# Patient Record
Sex: MALE | Race: WHITE | HISPANIC OR LATINO | Employment: FULL TIME | URBAN - METROPOLITAN AREA
[De-identification: names, ages, dates, MRNs, and addresses within clinical notes are randomized per-mention and may not be internally consistent; named-entity substitution may affect disease eponyms.]

---

## 2018-05-17 ENCOUNTER — OFFICE VISIT (OUTPATIENT)
Dept: URGENT CARE | Facility: CLINIC | Age: 53
End: 2018-05-17
Payer: COMMERCIAL

## 2018-05-17 VITALS
HEART RATE: 86 BPM | HEIGHT: 66 IN | WEIGHT: 195 LBS | BODY MASS INDEX: 31.34 KG/M2 | TEMPERATURE: 98.8 F | OXYGEN SATURATION: 92 % | SYSTOLIC BLOOD PRESSURE: 124 MMHG | DIASTOLIC BLOOD PRESSURE: 78 MMHG

## 2018-05-17 DIAGNOSIS — J06.9 URI WITH COUGH AND CONGESTION: ICD-10-CM

## 2018-05-17 DIAGNOSIS — E66.9 OBESITY (BMI 30-39.9): ICD-10-CM

## 2018-05-17 DIAGNOSIS — J40 BRONCHITIS: Primary | ICD-10-CM

## 2018-05-17 DIAGNOSIS — J01.10 ACUTE NON-RECURRENT FRONTAL SINUSITIS: ICD-10-CM

## 2018-05-17 PROCEDURE — 99214 OFFICE O/P EST MOD 30 MIN: CPT | Performed by: PHYSICIAN ASSISTANT

## 2018-05-17 RX ORDER — METHYLPREDNISOLONE 4 MG/1
TABLET ORAL
Qty: 21 TAB | Refills: 0 | Status: SHIPPED | OUTPATIENT
Start: 2018-05-17 | End: 2023-02-27

## 2018-05-17 RX ORDER — PROMETHAZINE HYDROCHLORIDE AND CODEINE PHOSPHATE 6.25; 1 MG/5ML; MG/5ML
5 SYRUP ORAL 4 TIMES DAILY PRN
Qty: 240 ML | Refills: 0 | Status: SHIPPED | OUTPATIENT
Start: 2018-05-17 | End: 2018-05-31

## 2018-05-17 RX ORDER — DOXYCYCLINE HYCLATE 100 MG
100 TABLET ORAL 2 TIMES DAILY
Qty: 14 TAB | Refills: 0 | Status: SHIPPED | OUTPATIENT
Start: 2018-05-17 | End: 2018-05-24

## 2018-05-17 NOTE — PATIENT INSTRUCTIONS
Bronquitis aguda  (Acute Bronchitis)  Se denomina bronquitis cuando las vías respiratorias que van desde la tráquea hasta los pulmones se irritan, se congestionan y duelen (se inflaman). La bronquitis generalmente produce flema espesa (mucosidad). [place] provoca tos. La tos es el síntoma más frecuente de la bronquitis.  Cuando la bronquitis es aguda, generalmente comienza de manera súbita y desaparece luego de algún tiempo (generalmente en 2 semanas). El hábito de fumar, las alergias y el asma pueden empeorar la bronquitis. Los episodios repetidos de bronquitis pueden causar más problemas pulmonares.  CUIDADOS EN EL HOGAR  · Reposo.  · Torri abundante cantidad de líquidos para mantener el pis orina) migue o amarillo pálido (excepto que debe limitar la ingesta de líquidos por indicación del médico).  · [place] sólo medicamentos de venta gibson o recetados, según las indicaciones del médico.  · Evite fumar o aspirar el humo de otros fumadores. [place] puede empeorar la bronquitis. Si es fumador, considere el uso de chicles o parches en la piel de nicotina. Si carolina de fumar, maria isabel pulmones se curarán más rápido.  · Reduzca la probabilidad de enfermarse nuevamente de bronquitis de hardik modo:  ¨ Lávese las yana con frecuencia.  ¨ Evite las personas que tengan síntomas de resfrío.  ¨ Trate de no llevarse las yana a la boca, la nariz o los ojos.  · Concurra a las consultas de control con el médico, según las indicaciones.  SOLICITE AYUDA SI:  Los síntomas no mejoran después de 1 semana de tratamiento. Los síntomas son:  · Tos.  · Fiebre.  · Eliminar moco espeso al toser.  · Judi en el cuerpo.  · Congestión en el pecho.  · Escalofríos.  · Falta de aire.  · Dolor de garganta.  SOLICITE AYUDA DE INMEDIATO SI:   · Le sube la fiebre.  · Tiene escalofríos.  · Comienza a sentir que le falta el aire de manera preocupante.  · Tiene expectoración con lyric (esputo).  · Devuelve (vomita) con frecuencia.  · Sanchez organismo pierde mucho líquido  (deshidratación).  · Sufre un dolor intenso de judi.  · Se desmaya.  ASEGÚRESE DE QUE:   · Comprende estas instrucciones.  · Controlará bagley afección.  · Recibirá ayuda de inmediato si no mejora o si empeora.  Esta información no tiene lena fin reemplazar el consejo del médico. Asegúrese de hacerle al médico cualquier pregunta que tenga.  Document Released: 08/20/2014  Elsevier Interactive Patient Education © 2017 [employer] Inc[employer]

## 2018-05-17 NOTE — PROGRESS NOTES
Subjective:      Pt is a 52 y.o. male who presents with URI (x3 days, coughing, wheezing, tightness in chest, fever )            HPI  PT presents to [facility] clinic today complaining of sore throat, fevers, chills, watery eyes, pressure in ears, cough, fatigue, runny nose, wheezing and SOB. PT denies CP, NVD, abdominal pain, joint pain. PT states these symptoms began around 3 days. PT states the pain is a 7/10 with coughing, aching in nature and worse at night.  Pt has not taken any RX medications for this condition. The pt's medication list, problem list, and allergies have been evaluated and reviewed during today's visit.    PMH:  Past Medical History:   Diagnosis Date   • Tubular adenoma of colon 9/2016       PSH:  Negative per pt.      Fam Hx:    family history includes Diabetes in his paternal uncle; Hypertension in his father.  Family Status   Relation Status   • Paternal Uncle    • Father        Soc HX:  Social History     Social History   • Marital status:      Spouse name: N/A   • Number of children: N/A   • Years of education: N/A     Occupational History   • Not on file.     Social History Main Topics   • Smoking status: Never Smoker   • Smokeless tobacco: Never Used   • Alcohol use 0.6 oz/week     1 Standard drinks or equivalent per week      Comment: occassionally   • Drug use: No   • Sexual activity: Yes     Other Topics Concern   • Not on file     Social History Narrative   • No narrative on file         Medications:    Current Outpatient Prescriptions:   •  doxycycline (VIBRAMYCIN) 100 MG Tab, Take 1 Tab by mouth 2 times a day for 7 days., Disp: 14 Tab, Rfl: 0  •  promethazine-codeine (PHENERGAN-CODEINE) 6.25-10 MG/5ML Syrup, Take 5 mL by mouth 4 times a day as needed for up to 14 days., Disp: 240 mL, Rfl: 0  •  MethylPREDNISolone (MEDROL DOSEPAK) 4 MG Tablet Therapy Pack, Use as directed, Disp: 21 Tab, Rfl: 0      Allergies:  Patient has no known allergies.    ROS  Review of Systems  "  Constitutional: Positive for malaise/fatigue. Negative for fever and diaphoresis.   HENT: Positive for congestion, ear pain and sore throat. Negative for ear discharge, hearing loss, nosebleeds and tinnitus.    Eyes: Negative for blurred vision, double vision and photophobia.   Respiratory: Positive for cough, sputum production, shortness of breath and wheezing. Negative for hemoptysis.    Cardiovascular: Negative for chest pain and palpitations.   Gastrointestinal: Negative for nausea, vomiting, abdominal pain, diarrhea and constipation.   Genitourinary: Negative for dysuria and flank pain.   Musculoskeletal: Negative for joint pain and myalgias.   Skin: Negative for itching and rash.   Neurological:  Negative for dizziness, tingling and weakness.   Endo/Heme/Allergies: Does not bruise/bleed easily.   Psychiatric/Behavioral: Negative for depression. The patient is not nervous/anxious.             Objective:     /78   Pulse 86   Temp 37.1 °C (98.8 °F)   Ht 1.676 m (5' 6\")   Wt 88.5 kg (195 lb)   SpO2 92%   BMI 31.47 kg/m²      Physical Exam      Physical Exam   Constitutional: PT is oriented to person, place, and time. PT appears well-developed and well-nourished. No distress.   HENT:   Head: Normocephalic and atraumatic.   Right Ear: Hearing, tympanic membrane, external ear and ear canal normal.   Left Ear: Hearing, tympanic membrane, external ear and ear canal normal.   Nose: Mucosal edema, rhinorrhea and sinus tenderness present. Right sinus exhibits frontal sinus tenderness. Left sinus exhibits frontal sinus tenderness.   Mouth/Throat: Uvula is midline. Mucous membranes are pale. Posterior oropharyngeal edema and posterior oropharyngeal erythema present. No oropharyngeal exudate.   Eyes: Conjunctivae normal and EOM are normal. Pupils are equal, round, and reactive to light. Right eye exhibits no discharge. Left eye exhibits no discharge.   Neck: Normal range of motion. Neck supple. No thyromegaly " present.   Cardiovascular: Normal rate, regular rhythm, normal heart sounds and intact distal pulses.  Exam reveals no gallop and no friction rub.    No murmur heard.  Pulmonary/Chest: Effort normal. No respiratory distress. PT has wheezes. PT has no rales. PT exhibits tenderness.   Abdominal: Soft. Bowel sounds are normal. PT exhibits no distension and no mass. There is no tenderness. There is no rebound and no guarding.   Musculoskeletal: Normal range of motion. PT exhibits no edema and no tenderness.   Lymphadenopathy:     PT has no cervical adenopathy.   Neurological: Pt is alert and oriented to person, place, and time. Pt has normal reflexes. No cranial nerve deficit.   Skin: Skin is warm and dry. No rash noted. No erythema.   Psychiatric: PT has a normal mood and affect. Pt behavior is normal. Judgment and thought content normal.          Assessment/Plan:     1. Bronchitis    - doxycycline (VIBRAMYCIN) 100 MG Tab; Take 1 Tab by mouth 2 times a day for 7 days.  Dispense: 14 Tab; Refill: 0  - MethylPREDNISolone (MEDROL DOSEPAK) 4 MG Tablet Therapy Pack; Use as directed  Dispense: 21 Tab; Refill: 0    2. Acute non-recurrent frontal sinusitis    - doxycycline (VIBRAMYCIN) 100 MG Tab; Take 1 Tab by mouth 2 times a day for 7 days.  Dispense: 14 Tab; Refill: 0  - MethylPREDNISolone (MEDROL DOSEPAK) 4 MG Tablet Therapy Pack; Use as directed  Dispense: 21 Tab; Refill: 0    3. URI with cough and congestion    - promethazine-codeine (PHENERGAN-CODEINE) 6.25-10 MG/5ML Syrup; Take 5 mL by mouth 4 times a day as needed for up to 14 days.  Dispense: 240 mL; Refill: 0  - MethylPREDNISolone (MEDROL DOSEPAK) 4 MG Tablet Therapy Pack; Use as directed  Dispense: 21 Tab; Refill: 0    4. Obesity (BMI 30-39.9)    - Patient identified as having weight management issue.  Appropriate orders and counseling given.    Rest, fluids encouraged.  OTC decongestant for congestion/cough  Note given for work.  AVS with medical info given.  Pt  was in full understanding and agreement with the plan.  Follow-up as needed if symptoms worsen or fail to improve.

## 2018-05-17 NOTE — LETTER
May 17, 2018       Patient: Raymond Ballesteros   YOB: 1965   Date of Visit: 5/17/2018         To Whom It May Concern:    It is my medical opinion that Raymond Ballesteros may be excused from work for the dates of 5/17/18-5/18/18.      If you have any questions or concerns, please don't hesitate to call [phone]          Sincerely,          Farooq Hobbs P.A.-C.  Electronically Signed

## 2018-05-24 ENCOUNTER — OFFICE VISIT (OUTPATIENT)
Dept: MEDICAL GROUP | Facility: CLINIC | Age: 53
End: 2018-05-24
Payer: COMMERCIAL

## 2018-05-24 VITALS
DIASTOLIC BLOOD PRESSURE: 74 MMHG | OXYGEN SATURATION: 95 % | WEIGHT: 198 LBS | TEMPERATURE: 97.9 F | HEIGHT: 66 IN | HEART RATE: 79 BPM | RESPIRATION RATE: 13 BRPM | SYSTOLIC BLOOD PRESSURE: 110 MMHG | BODY MASS INDEX: 31.82 KG/M2

## 2018-05-24 DIAGNOSIS — M25.572 CHRONIC PAIN OF LEFT ANKLE: ICD-10-CM

## 2018-05-24 DIAGNOSIS — R05.3 PERSISTENT COUGH FOR 3 WEEKS OR LONGER: ICD-10-CM

## 2018-05-24 DIAGNOSIS — G89.29 CHRONIC PAIN OF LEFT ANKLE: ICD-10-CM

## 2018-05-24 PROCEDURE — 99213 OFFICE O/P EST LOW 20 MIN: CPT | Performed by: FAMILY MEDICINE

## 2018-05-24 RX ORDER — ALBUTEROL SULFATE 90 UG/1
2 AEROSOL, METERED RESPIRATORY (INHALATION) EVERY 6 HOURS PRN
Qty: 8.5 G | Refills: 3 | Status: SHIPPED | OUTPATIENT
Start: 2018-05-24 | End: 2023-02-28

## 2018-05-24 ASSESSMENT — PATIENT HEALTH QUESTIONNAIRE - PHQ9: CLINICAL INTERPRETATION OF PHQ2 SCORE: 0

## 2018-05-24 NOTE — PROGRESS NOTES
78 Chief Complaint   Patient presents with   • Ankle Pain   • Cough       Subjective:     HPI:   Raymond Ballesteros presents today with the followin. Chronic pain of left ankle  6 months of left ankle pain.  The pain is intermittent.  No pain today.  It swells at times, rarely.  No change in skin.  No injury identified that began this.  Sometimes it hurts to walk on the ankle.  He would like an x-ray to further determine what is going on.  X-ray ordered discussed and placed.    2. Persistent cough for 3 weeks or longer  He is still getting some phlegm with his cough.  This is better and he feels the cough medication has been somewhat helpful but what concerns him is occasional wheezing at night.  Discussed an inhaler.  He has had this in the past and felt it was very helpful.  Prescription is sent.    His wife is a poorly controlled diabetic who has been off her medication for almost 2 years.  When they have relations he will get itching and sometimes a small rash.  She is also getting itching and an occasional rash.  She denies discharge and he denies discharge as well.  This is most consistent with yeast vulvitis or vaginitis most likely secondary to her diabetes issues.  I am addressing this with her and expect that this will improve for him as well.  His last lab test did not show diabetes.    Patient Active Problem List    Diagnosis Date Noted   • Obesity (BMI 30-39.9) 2018   • Tubular adenoma of colon 2016       Current medicines (including changes today)  Current Outpatient Prescriptions   Medication Sig Dispense Refill   • albuterol (PROAIR HFA) 108 (90 Base) MCG/ACT Aero Soln inhalation aerosol Inhale 2 Puffs by mouth every 6 hours as needed for Shortness of Breath. 8.5 g 3   • doxycycline (VIBRAMYCIN) 100 MG Tab Take 1 Tab by mouth 2 times a day for 7 days. 14 Tab 0   • promethazine-codeine (PHENERGAN-CODEINE) 6.25-10 MG/5ML Syrup Take 5 mL by mouth 4 times a day as needed for up to 14  "days. 240 mL 0   • MethylPREDNISolone (MEDROL DOSEPAK) 4 MG Tablet Therapy Pack Use as directed 21 Tab 0     No current facility-administered medications for this visit.        No Known Allergies    ROS: As per HPI       Objective:     Blood pressure 110/74, pulse 79, temperature 36.6 °C (97.9 °F), resp. rate 13, height 1.676 m (5' 6\"), weight 89.8 kg (198 lb), SpO2 95 %. Body mass index is 31.96 kg/m².    Physical Exam:  Constitutional: Well-developed and well-nourished. Not diaphoretic. No distress. Lucid and fluent.  Wet cough.  Skin: Skin is warm and dry. No rash noted.  Head: Atraumatic without lesions.  Eyes: Conjunctivae and extraocular motions are normal. Pupils are equal, round, and reactive to light. No scleral icterus.   Mouth/Throat: Tongue normal. Oropharynx is clear and moist. Posterior pharynx without erythema or exudates.  Neck: Supple, trachea midline. No thyromegaly present. No cervical or supraclavicular lymphadenopathy. No JVD or carotid bruits appreciated  Cardiovascular: Regular rate and rhythm.  Normal S1, S2 without murmur appreciated.  Chest: Effort normal. Clear to auscultation throughout. No adventitious sounds.  There are a few upper airway sounds.  No wheezing appreciated at this time.  Abdomen: Soft, non tender, and without distention. Active bowel sounds in all four quadrants. No rebound, guarding, masses or hepatosplenomegaly.  Extremities: No cyanosis, clubbing, erythema, nor edema.  Left ankle today does not have any significant effusion.  There is no tenderness at the joint line the patient states that is where tenderness occurs when it is present.  No erythema or heat is appreciated.  Flexion of the ankle is somewhat limited.  Extension is good.  Neurological: Alert and oriented x 3.  Gait is quite normal today.  Psychiatric:  Behavior, mood, and affect are appropriate.       Assessment and Plan:     52 y.o. male with the following issues:    1. Chronic pain of left ankle  " DX-ANKLE 3+ VIEWS RIGHT   2. Persistent cough for 3 weeks or longer  albuterol (PROAIR HFA) 108 (90 Base) MCG/ACT Aero Soln inhalation aerosol         Followup: Return in about 4 months (around 9/24/2018), or if symptoms worsen or fail to improve.

## 2018-06-21 ENCOUNTER — HOSPITAL ENCOUNTER (OUTPATIENT)
Dept: RADIOLOGY | Facility: MEDICAL CENTER | Age: 53
End: 2018-06-21
Attending: FAMILY MEDICINE
Payer: COMMERCIAL

## 2018-06-21 DIAGNOSIS — G89.29 CHRONIC PAIN OF LEFT ANKLE: ICD-10-CM

## 2018-06-21 DIAGNOSIS — M25.572 CHRONIC PAIN OF LEFT ANKLE: ICD-10-CM

## 2018-06-21 PROCEDURE — 73610 X-RAY EXAM OF ANKLE: CPT | Mod: RT

## 2018-11-12 ENCOUNTER — NON-PROVIDER VISIT (OUTPATIENT)
Dept: OCCUPATIONAL MEDICINE | Facility: CLINIC | Age: 53
End: 2018-11-12

## 2018-11-12 DIAGNOSIS — Z02.1 PRE-EMPLOYMENT DRUG SCREENING: ICD-10-CM

## 2018-11-12 PROCEDURE — 8907 PR URINE COLLECT ONLY: Performed by: PREVENTIVE MEDICINE

## 2018-11-20 ENCOUNTER — HOSPITAL ENCOUNTER (EMERGENCY)
Dept: HOSPITAL 8 - ED | Age: 53
Discharge: HOME | End: 2018-11-20
Payer: SELF-PAY

## 2018-11-20 VITALS — BODY MASS INDEX: 31.85 KG/M2 | WEIGHT: 198.2 LBS | HEIGHT: 66 IN

## 2018-11-20 VITALS — DIASTOLIC BLOOD PRESSURE: 83 MMHG | SYSTOLIC BLOOD PRESSURE: 125 MMHG

## 2018-11-20 DIAGNOSIS — R11.2: ICD-10-CM

## 2018-11-20 DIAGNOSIS — R42: Primary | ICD-10-CM

## 2018-11-20 LAB
ALBUMIN SERPL-MCNC: 3.6 G/DL (ref 3.4–5)
ALP SERPL-CCNC: 123 U/L (ref 45–117)
ALT SERPL-CCNC: 41 U/L (ref 12–78)
ANION GAP SERPL CALC-SCNC: 7 MMOL/L (ref 5–15)
BASOPHILS # BLD AUTO: 0.02 X10^3/UL (ref 0–0.1)
BASOPHILS NFR BLD AUTO: 0 % (ref 0–1)
BILIRUB SERPL-MCNC: 0.5 MG/DL (ref 0.2–1)
CALCIUM SERPL-MCNC: 8.6 MG/DL (ref 8.5–10.1)
CHLORIDE SERPL-SCNC: 107 MMOL/L (ref 98–107)
CREAT SERPL-MCNC: 0.93 MG/DL (ref 0.7–1.3)
EOSINOPHIL # BLD AUTO: 0.01 X10^3/UL (ref 0–0.4)
EOSINOPHIL NFR BLD AUTO: 0 % (ref 1–7)
ERYTHROCYTE [DISTWIDTH] IN BLOOD BY AUTOMATED COUNT: 13.7 % (ref 9.4–14.8)
LYMPHOCYTES # BLD AUTO: 1.55 X10^3/UL (ref 1–3.4)
LYMPHOCYTES NFR BLD AUTO: 19 % (ref 22–44)
MCH RBC QN AUTO: 31.2 PG (ref 27.5–34.5)
MCHC RBC AUTO-ENTMCNC: 34.2 G/DL (ref 33.2–36.2)
MCV RBC AUTO: 91.3 FL (ref 81–97)
MD: NO
MONOCYTES # BLD AUTO: 0.37 X10^3/UL (ref 0.2–0.8)
MONOCYTES NFR BLD AUTO: 4 % (ref 2–9)
NEUTROPHILS # BLD AUTO: 6.31 X10^3/UL (ref 1.8–6.8)
NEUTROPHILS NFR BLD AUTO: 77 % (ref 42–75)
PLATELET # BLD AUTO: 176 X10^3/UL (ref 130–400)
PMV BLD AUTO: 8.7 FL (ref 7.4–10.4)
PROT SERPL-MCNC: 7.2 G/DL (ref 6.4–8.2)
RBC # BLD AUTO: 5.58 X10^6/UL (ref 4.38–5.82)

## 2018-11-20 PROCEDURE — 83690 ASSAY OF LIPASE: CPT

## 2018-11-20 PROCEDURE — 96372 THER/PROPH/DIAG INJ SC/IM: CPT

## 2018-11-20 PROCEDURE — 99284 EMERGENCY DEPT VISIT MOD MDM: CPT

## 2018-11-20 PROCEDURE — 85025 COMPLETE CBC W/AUTO DIFF WBC: CPT

## 2018-11-20 PROCEDURE — 36415 COLL VENOUS BLD VENIPUNCTURE: CPT

## 2018-11-20 PROCEDURE — 74022 RADEX COMPL AQT ABD SERIES: CPT

## 2018-11-20 PROCEDURE — 80053 COMPREHEN METABOLIC PANEL: CPT

## 2019-11-25 ENCOUNTER — APPOINTMENT (OUTPATIENT)
Dept: URGENT CARE | Facility: CLINIC | Age: 54
End: 2019-11-25

## 2019-11-27 ENCOUNTER — OFFICE VISIT (OUTPATIENT)
Dept: URGENT CARE | Facility: CLINIC | Age: 54
End: 2019-11-27

## 2019-11-27 ENCOUNTER — APPOINTMENT (OUTPATIENT)
Dept: RADIOLOGY | Facility: IMAGING CENTER | Age: 54
End: 2019-11-27
Attending: PHYSICIAN ASSISTANT

## 2019-11-27 VITALS
HEART RATE: 66 BPM | HEIGHT: 66 IN | TEMPERATURE: 97.2 F | BODY MASS INDEX: 31.18 KG/M2 | RESPIRATION RATE: 18 BRPM | DIASTOLIC BLOOD PRESSURE: 82 MMHG | WEIGHT: 194 LBS | SYSTOLIC BLOOD PRESSURE: 116 MMHG | OXYGEN SATURATION: 97 %

## 2019-11-27 DIAGNOSIS — S99.912A INJURY OF LEFT ANKLE, INITIAL ENCOUNTER: ICD-10-CM

## 2019-11-27 DIAGNOSIS — S93.402A SPRAIN OF LEFT ANKLE, UNSPECIFIED LIGAMENT, INITIAL ENCOUNTER: ICD-10-CM

## 2019-11-27 PROCEDURE — 99213 OFFICE O/P EST LOW 20 MIN: CPT | Performed by: PHYSICIAN ASSISTANT

## 2019-11-27 PROCEDURE — 73610 X-RAY EXAM OF ANKLE: CPT | Mod: TC,LT | Performed by: PHYSICIAN ASSISTANT

## 2019-11-27 ASSESSMENT — ENCOUNTER SYMPTOMS
NAUSEA: 0
EYE DISCHARGE: 0
HEADACHES: 0
COUGH: 0
INABILITY TO BEAR WEIGHT: 0
NUMBNESS: 0
MUSCLE WEAKNESS: 0
TINGLING: 0
SORE THROAT: 0
FEVER: 0
EYE REDNESS: 0
SHORTNESS OF BREATH: 0
LOSS OF SENSATION: 0
VOMITING: 0
LOSS OF MOTION: 0

## 2019-11-27 NOTE — PROGRESS NOTES
Subjective:      Raymond De La Cruz is a 54 y.o. male who presents with Ankle Injury ((L)-x5 days, playing hand ball-swollen and bruised)        Ankle Injury    The incident occurred 5 to 7 days ago. [place] The injury mechanism was a twisting injury. The pain is present in the left ankle. The quality of the pain is described as aching. The pain is moderate. The pain has been constant since onset. Pertinent negatives include no inability to bear weight, loss of motion, loss of sensation, muscle weakness, numbness or tingling. The symptoms are aggravated by weight bearing and movement. He has tried nothing for the symptoms.     The patient presents to clinic complaining of an injury to his left ankle.  The patient states the injury occurred x5 days ago.  The patient states he was playing handball when he twisted his left ankle.  The patient reports associated swelling and bruising to his left ankle.  The patient reports increased pain with movement and weightbearing, but states this is slightly improving.  The patient denies numbness, tingling, or weakness.  The patient has not taken anything for his current symptoms.    PMH:  has a past medical history of Tubular adenoma of colon (9/2016).  MEDS:   Current Outpatient Medications:   •  albuterol (PROAIR HFA) 108 (90 Base) MCG/ACT Aero Soln inhalation aerosol, Inhale 2 Puffs by mouth every 6 hours as needed for Shortness of Breath., Disp: 8.5 g, Rfl: 3  •  MethylPREDNISolone (MEDROL DOSEPAK) 4 MG Tablet Therapy Pack, Use as directed, Disp: 21 Tab, Rfl: 0  ALLERGIES: No Known Allergies  SURGHX: History reviewed. No pertinent surgical history.  SOCHX:  reports that he has never smoked. He has never used smokeless tobacco. He reports current alcohol use of about 0.6 oz of alcohol per week. He reports that he does not use drugs.  FH: Family history was reviewed, no pertinent findings to report      Review of Systems   Constitutional:  "Negative for fever.   HENT: Negative for congestion, ear pain and sore throat.    Eyes: Negative for discharge and redness.   Respiratory: Negative for cough and shortness of breath.    Cardiovascular: Negative for chest pain and leg swelling.   Gastrointestinal: Negative for nausea and vomiting.   Musculoskeletal: Positive for joint pain.        + left ankle   Skin: Negative for rash.   Neurological: Negative for tingling, numbness and headaches.          Objective:     /82 (BP Location: Right arm)   Pulse 66   Temp 36.2 °C (97.2 °F) (Temporal)   Resp 18   Ht 1.676 m (5' 6\")   Wt 88 kg (194 lb)   SpO2 97%   BMI 31.31 kg/m²      Physical Exam  Constitutional:       General: He is not in acute distress.     Appearance: He is well-developed.   HENT:      Head: Normocephalic and atraumatic.      Right Ear: External ear normal.      Left Ear: External ear normal.      Nose: Nose normal.   Eyes:      Conjunctiva/sclera: Conjunctivae normal.   Neck:      Musculoskeletal: Normal range of motion and neck supple.   Cardiovascular:      Rate and Rhythm: Normal rate.   Pulmonary:      Effort: Pulmonary effort is normal.   Musculoskeletal:      Comments:   Left Ankle:  Tenderness to the left ankle overlying the lateral malleolus.  Diffuse swelling to the left ankle.  Ecchymosis present to the lateral, anterior, and medial aspects of the left ankle with various stages of healing.  No erythema.  No increased warmth.  Decreased ROM - secondary to swelling.  Neurovascular intact.  Strength 5/5 - dorsiflexion/plantarflexion of the left ankle/foot.  Normal gait.   Skin:     General: Skin is warm and dry.   Neurological:      Mental Status: He is alert and oriented to person, place, and time.          Progress:  Left Ankle XR:   XRs reviewed by me.   COMPARISON: None.     FINDINGS:  Soft tissue swelling.  No acute fracture identified.  Ankle mortise appears intact.  Tiny ankle ankle joint spurs.     IMPRESSION:     Soft " tissue swelling. No acute fracture identified.     Wrapped the patient's left ankle with an Ace bandage to provide additional compression for his swelling.    Assessment/Plan:     1. Injury of left ankle, initial encounter  - DX-ANKLE 3+ VIEWS LEFT; Future    2. Sprain of left ankle, unspecified ligament, initial encounter    Differential diagnoses, supportive care, and indications for immediate follow-up discussed with patient.   Instructed to return to clinic or nearest emergency department for any change in condition, further concerns, or worsening of symptoms.    OTC NSAIDs for pain/discomfort   RICE  Wear brace for additional support  Weight-bearing as tolerated  Follow-up with PCP   Return to clinic or go tot he ED if symptoms worsen or fail to improve, or if the patient should develop worsening/increasing pain/tenderness, swelling, bruising, redness or warmth to the affected area, decreased ROM, numbness, tingling or weakness, difficulty walking, fever/chills, and/or any concerning symptoms.     Discussed plan with the patient, and he agrees to the above.

## 2020-03-09 ENCOUNTER — APPOINTMENT (OUTPATIENT)
Dept: MEDICAL GROUP | Facility: MEDICAL CENTER | Age: 55
End: 2020-03-09

## 2022-12-10 ENCOUNTER — OFFICE VISIT (OUTPATIENT)
Dept: URGENT CARE | Facility: PHYSICIAN GROUP | Age: 57
End: 2022-12-10
Payer: COMMERCIAL

## 2022-12-10 ENCOUNTER — HOSPITAL ENCOUNTER (OUTPATIENT)
Dept: RADIOLOGY | Facility: MEDICAL CENTER | Age: 57
End: 2022-12-10
Attending: NURSE PRACTITIONER
Payer: COMMERCIAL

## 2022-12-10 VITALS
WEIGHT: 190 LBS | RESPIRATION RATE: 18 BRPM | HEIGHT: 66 IN | OXYGEN SATURATION: 98 % | TEMPERATURE: 98.2 F | BODY MASS INDEX: 30.53 KG/M2 | SYSTOLIC BLOOD PRESSURE: 134 MMHG | DIASTOLIC BLOOD PRESSURE: 82 MMHG | HEART RATE: 97 BPM

## 2022-12-10 DIAGNOSIS — S46.912A ELBOW STRAIN, LEFT, INITIAL ENCOUNTER: ICD-10-CM

## 2022-12-10 DIAGNOSIS — S51.012A LACERATION OF LEFT ELBOW, INITIAL ENCOUNTER: ICD-10-CM

## 2022-12-10 DIAGNOSIS — S59.902A INJURY OF LEFT ELBOW, INITIAL ENCOUNTER: ICD-10-CM

## 2022-12-10 PROCEDURE — 99213 OFFICE O/P EST LOW 20 MIN: CPT | Performed by: NURSE PRACTITIONER

## 2022-12-10 PROCEDURE — 73080 X-RAY EXAM OF ELBOW: CPT | Mod: LT

## 2022-12-10 ASSESSMENT — ENCOUNTER SYMPTOMS
SENSORY CHANGE: 0
CHILLS: 0
MYALGIAS: 1
FALLS: 0
BRUISES/BLEEDS EASILY: 0
FEVER: 0
TINGLING: 0
WEAKNESS: 0

## 2022-12-10 NOTE — PROGRESS NOTES
Subjective     Raymond De La Cruz is a 57 y.o. male who presents with Elbow Injury (L elbow, fell 2 weeks ago. Small wound isnt healing)            HPI   had fallen in the shower 2 weeks ago and bumped his left elbow on the side of shower object.  Laceration to elbow.  He did not have his elbow or laceration looked out after this incident.  States he does have some discomfort with movement but is able to move his arm with no difficulty.  No noted weakness or numbness tingling in left upper extremity.  States does have some skin exposure at laceration site that is tender.  Denies drainage, swelling or redness at wound.    PMH:  has a past medical history of Tubular adenoma of colon (9/2016).  MEDS:   Current Outpatient Medications:     albuterol (PROAIR HFA) 108 (90 Base) MCG/ACT Aero Soln inhalation aerosol, Inhale 2 Puffs by mouth every 6 hours as needed for Shortness of Breath. (Patient not taking: Reported on 12/10/2022), Disp: 8.5 g, Rfl: 3    MethylPREDNISolone (MEDROL DOSEPAK) 4 MG Tablet Therapy Pack, Use as directed (Patient not taking: Reported on 12/10/2022), Disp: 21 Tab, Rfl: 0  ALLERGIES: No Known Allergies  SURGHX: History reviewed. No pertinent surgical history.  SOCHX:  reports that he has never smoked. He has never used smokeless tobacco. He reports current alcohol use of about 0.6 oz per week. He reports that he does not use drugs.  FH: Family history was reviewed, no pertinent findings to report    Review of Systems   Constitutional:  Negative for chills, fever and malaise/fatigue.   Musculoskeletal:  Positive for joint pain and myalgias. Negative for falls.   Skin:  Negative for itching and rash.        Healing wound to left elbow   Neurological:  Negative for tingling, sensory change and weakness.   Endo/Heme/Allergies:  Does not bruise/bleed easily.   All other systems reviewed and are negative.           Objective     /82   Pulse 97   Temp 36.8 °C (98.2 °F)   Resp 18    "Ht 1.676 m (5' 6\")   Wt 86.2 kg (190 lb)   SpO2 98%   BMI 30.67 kg/m²      Physical Exam  Vitals reviewed.   Constitutional:       General: He is awake. He is not in acute distress.     Appearance: Normal appearance. He is well-developed. He is not ill-appearing, toxic-appearing or diaphoretic.   HENT:      Head: Normocephalic.   Cardiovascular:      Rate and Rhythm: Normal rate.   Pulmonary:      Effort: Pulmonary effort is normal.   Musculoskeletal:      Left elbow: Laceration present. No swelling, deformity or effusion. Normal range of motion. Tenderness present in olecranon process.   Skin:     General: Skin is warm and dry.      Findings: Laceration and wound present. No bruising, ecchymosis or erythema.      Comments: Superficial healing laceration to left elbow that olecranon region with prominent granulation tissue due to wound not being approximated after injury is present.  Granulation tissue is pink, warm, dry without necrosis, swelling, erythema or drainage from site.  Unable to approximate at this time due to age of injury. Tenderness on palpation of this tissue.  Medical assistant has applied protective bandage to area.   Neurological:      Mental Status: He is alert and oriented to person, place, and time.   Psychiatric:         Attention and Perception: Attention normal.         Mood and Affect: Mood normal.         Speech: Speech normal.         Behavior: Behavior normal. Behavior is cooperative.                           Assessment & Plan        1. Injury of left elbow, initial encounter    - DX-ELBOW-COMPLETE 3+ LEFT; Future    2. Laceration of left elbow, initial encounter      3. Elbow strain, left, initial encounter        -May apply cool compress to area for any swelling   -Keep area clean with mild soap and tepid water, pat dry  -May apply triple antibiotic ointment with no weeping, drainage from site  -May cover with protective bandage to prevent dirt or debris into wound  -May use elbow " sleeve for support and stability of elbow when performing repetitive motions  -Monitor for skin infection with increased swelling, redness, pain, discharge, fever, malaise, red streaking-recheck  -Return as needed

## 2023-02-04 ENCOUNTER — OFFICE VISIT (OUTPATIENT)
Dept: URGENT CARE | Facility: PHYSICIAN GROUP | Age: 58
End: 2023-02-04
Payer: COMMERCIAL

## 2023-02-04 VITALS
OXYGEN SATURATION: 96 % | HEIGHT: 66 IN | HEART RATE: 83 BPM | SYSTOLIC BLOOD PRESSURE: 128 MMHG | TEMPERATURE: 97.7 F | RESPIRATION RATE: 16 BRPM | BODY MASS INDEX: 30.12 KG/M2 | WEIGHT: 187.4 LBS | DIASTOLIC BLOOD PRESSURE: 80 MMHG

## 2023-02-04 DIAGNOSIS — L02.416 ABSCESS OF LEFT LOWER LEG: ICD-10-CM

## 2023-02-04 PROCEDURE — 10060 I&D ABSCESS SIMPLE/SINGLE: CPT | Performed by: FAMILY MEDICINE

## 2023-02-04 PROCEDURE — 99214 OFFICE O/P EST MOD 30 MIN: CPT | Mod: 25 | Performed by: FAMILY MEDICINE

## 2023-02-04 RX ORDER — SULFAMETHOXAZOLE AND TRIMETHOPRIM 800; 160 MG/1; MG/1
1 TABLET ORAL 2 TIMES DAILY
Qty: 10 TABLET | Refills: 0 | Status: SHIPPED | OUTPATIENT
Start: 2023-02-04 | End: 2023-02-09

## 2023-02-04 ASSESSMENT — ENCOUNTER SYMPTOMS
DIZZINESS: 0
COUGH: 0
SHORTNESS OF BREATH: 0
VOMITING: 0
MYALGIAS: 0
CHILLS: 0
SORE THROAT: 0
FEVER: 0
NAUSEA: 0

## 2023-02-04 NOTE — PROCEDURES
I and D    Date/Time: 2/4/2023 2:09 PM  Performed by: Saulo Perera M.D.  Authorized by: Saulo Perera M.D.   Type: abscess  Location: Left leg.    Anesthesia:  Local Anesthetic: lidocaine 1% with epinephrine  Anesthetic total: 2 mL  Scalpel size: 11  Incision type: single straight  Incision depth: subcutaneous  Complexity: simple  Drainage: purulent  Drainage amount: moderate  Wound treatment: wound left open  Packing material: Dressing applied.  Patient tolerance: patient tolerated the procedure well with no immediate complications  Comments: The patient tolerated procedure well and was given wound care instructions and precautions

## 2023-02-04 NOTE — PROGRESS NOTES
Subjective:   Raymond De La Cruz is a 57 y.o. male who presents for Bump (Bump on left leg x 2 weeks)        A qualified  was used to interpret   during this encounter.        Rash  Chronicity: Reports redness swelling pain anterior aspect of the left leg over the past 2 weeks, denies recollection of traumatic injury. Episode onset: 2 weeks. The problem has been gradually worsening since onset. Location: Anterior left leg. The rash is characterized by redness and swelling. He was exposed to nothing. Pertinent negatives include no cough, fever, shortness of breath, sore throat or vomiting. Treatments tried: Routine skin hygiene. The treatment provided no relief.   PMH:  has a past medical history of Tubular adenoma of colon (9/2016).  MEDS:   Current Outpatient Medications:     sulfamethoxazole-trimethoprim (BACTRIM DS) 800-160 MG tablet, Take 1 Tablet by mouth 2 times a day for 5 days., Disp: 10 Tablet, Rfl: 0    albuterol (PROAIR HFA) 108 (90 Base) MCG/ACT Aero Soln inhalation aerosol, Inhale 2 Puffs by mouth every 6 hours as needed for Shortness of Breath. (Patient not taking: Reported on 12/10/2022), Disp: 8.5 g, Rfl: 3    MethylPREDNISolone (MEDROL DOSEPAK) 4 MG Tablet Therapy Pack, Use as directed (Patient not taking: Reported on 12/10/2022), Disp: 21 Tab, Rfl: 0  ALLERGIES: No Known Allergies  SURGHX: History reviewed. No pertinent surgical history.  SOCHX:  reports that he has never smoked. He has never used smokeless tobacco. He reports current alcohol use of about 0.6 oz per week. He reports that he does not use drugs.  FH:   Family History   Problem Relation Age of Onset    Diabetes Paternal Uncle     Hypertension Father      Review of Systems   Constitutional:  Negative for chills and fever.   HENT:  Negative for sore throat.    Respiratory:  Negative for cough and shortness of breath.    Gastrointestinal:  Negative for nausea and vomiting.   Musculoskeletal:  Negative for  "myalgias.   Skin:  Positive for rash.   Neurological:  Negative for dizziness.      Objective:   /80 (BP Location: Right arm, Patient Position: Sitting, BP Cuff Size: Adult)   Pulse 83   Temp 36.5 °C (97.7 °F) (Temporal)   Resp 16   Ht 1.676 m (5' 6\")   Wt 85 kg (187 lb 6.4 oz)   SpO2 96%   BMI 30.25 kg/m²   Physical Exam  Vitals and nursing note reviewed.   Constitutional:       General: He is not in acute distress.     Appearance: He is well-developed.   HENT:      Head: Normocephalic and atraumatic.      Right Ear: External ear normal.      Left Ear: External ear normal.      Nose: Nose normal.      Mouth/Throat:      Mouth: Mucous membranes are moist.   Eyes:      Conjunctiva/sclera: Conjunctivae normal.   Cardiovascular:      Rate and Rhythm: Normal rate.   Pulmonary:      Effort: Pulmonary effort is normal. No respiratory distress.      Breath sounds: Normal breath sounds.   Abdominal:      General: There is no distension.   Musculoskeletal:         General: Normal range of motion.      Left lower leg: Swelling present. Edema present.        Legs:    Skin:     General: Skin is warm and dry.   Neurological:      General: No focal deficit present.      Mental Status: He is alert and oriented to person, place, and time. Mental status is at baseline.      Gait: Gait (gait at baseline) normal.   Psychiatric:         Judgment: Judgment normal.         Assessment/Plan:   1. Abscess of left lower leg  - sulfamethoxazole-trimethoprim (BACTRIM DS) 800-160 MG tablet; Take 1 Tablet by mouth 2 times a day for 5 days.  Dispense: 10 Tablet; Refill: 0  - I and D        Medical Decision Making/Course:  In the course of preparing for this visit with review of the pertinent past medical history, recent and past clinic visits, current medications, and performing chart, immunization, medical history and medication reconciliation, and in the further course of obtaining the current history pertinent to the clinic visit " today, performing an exam and evaluation, ordering and independently evaluating labs, tests  , and/or procedures including incision and drainage see procedure note, prescribing any recommended new medications as noted above, providing any pertinent counseling and education and recommending further coordination of care, at least  32 minutes of total time were spent during this encounter.      Discussed close monitoring, return precautions, and supportive measures of maintaining adequate fluid hydration and caloric intake, relative rest and symptom management as needed for pain and/or fever.    Differential diagnosis, natural history, supportive care, and indications for immediate follow-up discussed.     Advised the patient to follow-up with the primary care physician for recheck, reevaluation, and consideration of further management.    Please note that this dictation was created using voice recognition software. I have worked with consultants from the vendor as well as technical experts from [employer] to optimize the interface. I have made every reasonable attempt to correct obvious errors, but I expect that there are errors of grammar and possibly content that I did not discover before finalizing the note.

## 2023-02-27 PROBLEM — J30.2 SEASONAL ALLERGIC RHINITIS: Status: ACTIVE | Noted: 2020-06-09

## 2023-02-27 PROBLEM — R35.1 NOCTURIA: Status: ACTIVE | Noted: 2022-01-12

## 2023-02-27 PROBLEM — I10 ESSENTIAL HYPERTENSION: Status: ACTIVE | Noted: 2022-02-14

## 2023-02-27 PROBLEM — R06.83 SNORING: Status: ACTIVE | Noted: 2020-06-09

## 2023-02-27 PROBLEM — E78.2 MIXED HYPERLIPIDEMIA: Status: ACTIVE | Noted: 2020-06-09

## 2023-02-27 RX ORDER — GEMFIBROZIL 600 MG/1
TABLET, FILM COATED ORAL
COMMUNITY
End: 2023-02-28

## 2023-02-27 RX ORDER — LISINOPRIL 10 MG/1
TABLET ORAL
COMMUNITY
End: 2023-02-28

## 2023-02-28 ENCOUNTER — OFFICE VISIT (OUTPATIENT)
Dept: MEDICAL GROUP | Facility: MEDICAL CENTER | Age: 58
End: 2023-02-28
Payer: COMMERCIAL

## 2023-02-28 VITALS
DIASTOLIC BLOOD PRESSURE: 78 MMHG | HEIGHT: 66 IN | HEART RATE: 75 BPM | OXYGEN SATURATION: 95 % | BODY MASS INDEX: 30.47 KG/M2 | SYSTOLIC BLOOD PRESSURE: 116 MMHG | WEIGHT: 189.6 LBS | TEMPERATURE: 97.5 F

## 2023-02-28 DIAGNOSIS — Z12.5 PROSTATE CANCER SCREENING: ICD-10-CM

## 2023-02-28 DIAGNOSIS — Z12.11 COLON CANCER SCREENING: ICD-10-CM

## 2023-02-28 DIAGNOSIS — D12.6 TUBULAR ADENOMA OF COLON: ICD-10-CM

## 2023-02-28 DIAGNOSIS — Z11.59 NEED FOR HEPATITIS C SCREENING TEST: ICD-10-CM

## 2023-02-28 DIAGNOSIS — E78.2 MIXED HYPERLIPIDEMIA: ICD-10-CM

## 2023-02-28 DIAGNOSIS — Z23 NEED FOR VACCINATION: ICD-10-CM

## 2023-02-28 DIAGNOSIS — Z13.21 ENCOUNTER FOR VITAMIN DEFICIENCY SCREENING: ICD-10-CM

## 2023-02-28 DIAGNOSIS — I10 ESSENTIAL HYPERTENSION: ICD-10-CM

## 2023-02-28 DIAGNOSIS — E11.69 TYPE 2 DIABETES MELLITUS WITH OTHER SPECIFIED COMPLICATION, WITHOUT LONG-TERM CURRENT USE OF INSULIN (HCC): ICD-10-CM

## 2023-02-28 DIAGNOSIS — E66.9 OBESITY (BMI 30-39.9): ICD-10-CM

## 2023-02-28 PROBLEM — E11.9 DIABETES MELLITUS (HCC): Status: ACTIVE | Noted: 2023-02-28

## 2023-02-28 PROBLEM — E11.9 TYPE 2 DIABETES MELLITUS, WITHOUT LONG-TERM CURRENT USE OF INSULIN (HCC): Chronic | Status: ACTIVE | Noted: 2023-02-28

## 2023-02-28 LAB
HBA1C MFR BLD: 6 % (ref ?–5.8)
POCT INT CON NEG: NEGATIVE
POCT INT CON POS: POSITIVE

## 2023-02-28 PROCEDURE — 83036 HEMOGLOBIN GLYCOSYLATED A1C: CPT | Performed by: FAMILY MEDICINE

## 2023-02-28 PROCEDURE — 90471 IMMUNIZATION ADMIN: CPT | Performed by: FAMILY MEDICINE

## 2023-02-28 PROCEDURE — 90715 TDAP VACCINE 7 YRS/> IM: CPT | Performed by: FAMILY MEDICINE

## 2023-02-28 PROCEDURE — 99214 OFFICE O/P EST MOD 30 MIN: CPT | Mod: 25 | Performed by: FAMILY MEDICINE

## 2023-02-28 ASSESSMENT — ENCOUNTER SYMPTOMS
BLOOD IN STOOL: 0
POLYDIPSIA: 0
SHORTNESS OF BREATH: 0
BLURRED VISION: 0
HEADACHES: 0
FALLS: 0
CONSTIPATION: 0
VOMITING: 0
SENSORY CHANGE: 0
TINGLING: 0
DIARRHEA: 0
NAUSEA: 0
DIZZINESS: 0
WEIGHT LOSS: 0

## 2023-02-28 ASSESSMENT — PATIENT HEALTH QUESTIONNAIRE - PHQ9: CLINICAL INTERPRETATION OF PHQ2 SCORE: 0

## 2023-02-28 NOTE — ASSESSMENT & PLAN NOTE
Colonoscopy report reviewed, showed tubular adenoma with a 5 year recall done in 2016. Will refer patient back for follow up colonoscopy. No red flag symptoms reported.

## 2023-02-28 NOTE — ASSESSMENT & PLAN NOTE
This is a chronic condition, controlled.  Blood pressure is at goal under 140/90 in the office today.  We will continue the current regimen.  - not currently taking medication, will monitor with more than one visit/measurement

## 2023-02-28 NOTE — ASSESSMENT & PLAN NOTE
Current A1C is 6.0, he is diet controlled and will not start glycemic controlling medication at this time. Labs to follow and record request to be checked. Sent for most recent eye exam. No neuropathy on foot exam. Patient to continue with healthy diet.

## 2023-02-28 NOTE — PATIENT INSTRUCTIONS
Referral for colonoscopy please schedule as able  Please do fasting labs, can call [phone] to make an appointment and then have an appointment with me afterwards

## 2023-02-28 NOTE — ASSESSMENT & PLAN NOTE
Patient was prescribed Lopid at some point but no longer taking, suspect he had elevated sugars and triglycerides. Will get updated lipid panel and treat as indicated.

## 2023-02-28 NOTE — PROGRESS NOTES
Subjective:     CC:  Diagnoses of Type 2 diabetes mellitus with other specified complication, without long-term current use of insulin (HCC), Colon cancer screening, Obesity (BMI 30-39.9), Tubular adenoma of colon, Mixed hyperlipidemia, Essential hypertension, Prostate cancer screening, Encounter for vitamin deficiency screening, Need for hepatitis C screening test, and Need for vaccination were pertinent to this visit.    HISTORY OF THE PRESENT ILLNESS: Patient is a 57 y.o. male. This pleasant patient is here today to establish care and discuss previous diagnosis of diabetes. He presents with his wife who helps with history and , they decline translation services.    Was prescribed medication for diabetes over a year ago but didn't feel good on the medication so stopped taking it.  Is not taking blood pressure or cholesterol medication either.  Had colonoscopy in 2016 with a polyp removed with a 5 year recall  Does get some pain in buttocks with sitting for long periods of time    Problem   Type 2 Diabetes Mellitus, Without Long-Term Current Use of Insulin (Hcc)   Essential Hypertension   Nocturia   Snoring   Seasonal Allergic Rhinitis   Mixed Hyperlipidemia   Obesity (Bmi 30-39.9)   Tubular Adenoma of Colon       No current [facility]-ordered outpatient medications on file.     No current [facility]-ordered facility-administered medications on file.       Health Maintenance: Tdap, colonoscopy, HCV screen, POC A1C    ROS:   Review of Systems   Constitutional:  Negative for weight loss.   Eyes:  Negative for blurred vision.   Respiratory:  Negative for shortness of breath.    Cardiovascular:  Negative for chest pain.   Gastrointestinal:  Negative for blood in stool, constipation, diarrhea, nausea and vomiting.   Genitourinary:  Positive for frequency.   Musculoskeletal:  Negative for falls.   Neurological:  Negative for dizziness, tingling, sensory change and headaches.   Endo/Heme/Allergies:  Negative for polydipsia.  "      Objective:       Exam: /78   Pulse 75   Temp 36.4 °C (97.5 °F) (Temporal)   Ht 1.676 m (5' 6\")   Wt 86 kg (189 lb 9.6 oz)   SpO2 95%  Body mass index is 30.6 kg/m².    Physical Exam  Vitals reviewed.   Constitutional:       General: He is not in acute distress.     Appearance: Normal appearance. He is obese.   HENT:      Head: Normocephalic and atraumatic.      Right Ear: Tympanic membrane and ear canal normal.      Left Ear: Tympanic membrane and ear canal normal.      Mouth/Throat:      Mouth: Mucous membranes are moist.      Pharynx: Oropharynx is clear.   Eyes:      Conjunctiva/sclera: Conjunctivae normal.   Cardiovascular:      Rate and Rhythm: Normal rate and regular rhythm.      Pulses: Normal pulses.           Dorsalis pedis pulses are 2+ on the right side and 2+ on the left side.        Posterior tibial pulses are 2+ on the right side and 2+ on the left side.      Heart sounds: Normal heart sounds.   Pulmonary:      Effort: Pulmonary effort is normal. No respiratory distress.      Breath sounds: Normal breath sounds.   Abdominal:      General: There is no distension.      Palpations: Abdomen is soft.      Tenderness: There is no abdominal tenderness. There is no right CVA tenderness, left CVA tenderness or guarding.   Feet:      Right foot:      Protective Sensation: 4 sites tested.  4 sites sensed.      Skin integrity: Skin integrity normal.      Toenail Condition: Right toenails are normal.      Left foot:      Protective Sensation: 4 sites tested.  4 sites sensed.      Skin integrity: Skin integrity normal.      Toenail Condition: Left toenails are normal.   Skin:     General: Skin is warm and dry.      Capillary Refill: Capillary refill takes less than 2 seconds.   Neurological:      Mental Status: He is alert. Mental status is at baseline.   Psychiatric:         Mood and Affect: Mood normal.         Behavior: Behavior normal.         Labs: POC A1C 6.0    Assessment & Plan:   57 y.o. " male with the following -    Problem List Items Addressed This Visit       Type 2 diabetes mellitus, without long-term current use of insulin ([facility]) (Chronic)     Current A1C is 6.0, he is diet controlled and will not start glycemic controlling medication at this time. Labs to follow and record request to be checked. Sent for most recent eye exam. No neuropathy on foot exam. Patient to continue with healthy diet.         Relevant Orders    POCT Hemoglobin A1C (Completed)    CBC WITHOUT DIFFERENTIAL    Comp Metabolic Panel    Lipid Profile    MICROALBUMIN CREAT RATIO URINE    VITAMIN B12    Tubular adenoma of colon     Colonoscopy report reviewed, showed tubular adenoma with a 5 year recall done in 2016. Will refer patient back for follow up colonoscopy. No red flag symptoms reported.         Relevant Orders    Referral to GI for Colonoscopy    Obesity (BMI 30-39.9)     Weight today 189 pounds with BMI of 30.6  Patient to continue with healthy diet, A1C is under 7 which is a good sign         Relevant Orders    Comp Metabolic Panel    Lipid Profile    TSH WITH REFLEX TO FT4    Mixed hyperlipidemia     Patient was prescribed Lopid at some point but no longer taking, suspect he had elevated sugars and triglycerides. Will get updated lipid panel and treat as indicated.         Relevant Orders    Comp Metabolic Panel    Lipid Profile    Essential hypertension     This is a chronic condition, controlled.  Blood pressure is at goal under 140/90 in the office today.  We will continue the current regimen.  - not currently taking medication, will monitor with more than one visit/measurement         Relevant Orders    Comp Metabolic Panel    MICROALBUMIN CREAT RATIO URINE     Other Visit Diagnoses       Colon cancer screening        Relevant Orders    Referral to GI for Colonoscopy    Prostate cancer screening        Relevant Orders    PROSTATE SPECIFIC AG SCREENING    Encounter for vitamin deficiency screening        Relevant  Orders    VITAMIN B12    VITAMIN D,25 HYDROXY (DEFICIENCY)    Need for hepatitis C screening test        Relevant Orders    HCV Scrn ( [id number] [employer])    Need for vaccination        Relevant Orders    Tdap =>6yo IM (Completed)          Return in about 4 weeks (around 3/28/2023) for Lab F/U.    Please note that this dictation was created using voice recognition software. I have made every reasonable attempt to correct obvious errors, but I expect that there are errors of grammar and possibly content that I did not discover before finalizing the note.

## 2023-02-28 NOTE — LETTER
[facility]  Danny Witt D.O.  [address]  [place] [address]  Fax: [phone]   Authorization for Release/Disclosure of   Protected Health Information   Name: WALKER DE LA CRUZ : 1965 SSN: [id number]   Address: [address] [place] [address] Phone:    [phone] (home)    I authorize the entity listed below to release/disclose the PHI below to:   [facility]/Danny Witt D.O. and Danny Witt D.O.   Provider or Entity Name:  [facility]   Address   City, State, Zip   Phone:      Fax:     Reason for request: continuity of care   Information to be released:    [  ] LAST COLONOSCOPY,  including any PATH REPORT and follow-up  [  ] LAST FIT/COLOGUARD RESULT [  ] LAST DEXA  [  ] LAST MAMMOGRAM  [  ] LAST PAP  [  ] LAST LABS [  ] RETINA EXAM REPORT  [  ] IMMUNIZATION RECORDS  [ xxx ] Release all info      [  ] Check here and initial the line next to each item to release ALL health information INCLUDING  _____ Care and treatment for drug and / or alcohol abuse  _____ HIV testing, infection status, or AIDS  _____ Genetic Testing    DATES OF SERVICE OR TIME PERIOD TO BE DISCLOSED: _____________  I understand and acknowledge that:  * This Authorization may be revoked at any time by you in writing, except if your health information has already been used or disclosed.  * Your health information that will be used or disclosed as a result of you signing this authorization could be re-disclosed by the recipient. If this occurs, your re-disclosed health information may no longer be protected by State or Federal laws.  * You may refuse to sign this Authorization. Your refusal will not affect your ability to obtain treatment.  * This Authorization becomes effective upon signing and will  on (date) __________.      If no date is indicated, this Authorization will  one (1) year from the signature date.    Name: Walker De La Cruz  Signature: Date:    2/28/2023     PLEASE FAX REQUESTED RECORDS BACK TO: [phone]

## 2023-02-28 NOTE — LETTER
[facility]  Danny Witt D.O.  [address]  [place] [address]  Fax: [phone]   Authorization for Release/Disclosure of   Protected Health Information   Name: WALKER DE LA CRUZ : 1965 SSN: [id number]   Address: [address] [place] [address] Phone:    [phone] (home)    I authorize the entity listed below to release/disclose the PHI below to:   [facility]/Danny Witt D.O. and Danny Witt D.O.   Provider or Entity Name:  [facility]   Address   City, State, Zip   Phone:      Fax:     Reason for request: continuity of care   Information to be released:    [  ] LAST COLONOSCOPY,  including any PATH REPORT and follow-up  [  ] LAST FIT/COLOGUARD RESULT [  ] LAST DEXA  [  ] LAST MAMMOGRAM  [  ] LAST PAP  [  ] LAST LABS [xxx  ] RETINA EXAM REPORT  [  ] IMMUNIZATION RECORDS  [  ] Release all info      [  ] Check here and initial the line next to each item to release ALL health information INCLUDING  _____ Care and treatment for drug and / or alcohol abuse  _____ HIV testing, infection status, or AIDS  _____ Genetic Testing    DATES OF SERVICE OR TIME PERIOD TO BE DISCLOSED: _____________  I understand and acknowledge that:  * This Authorization may be revoked at any time by you in writing, except if your health information has already been used or disclosed.  * Your health information that will be used or disclosed as a result of you signing this authorization could be re-disclosed by the recipient. If this occurs, your re-disclosed health information may no longer be protected by State or Federal laws.  * You may refuse to sign this Authorization. Your refusal will not affect your ability to obtain treatment.  * This Authorization becomes effective upon signing and will  on (date) __________.      If no date is indicated, this Authorization will  one (1) year from the signature date.    Name: Walker De La Cruz  Signature: Date:    2/28/2023     PLEASE FAX REQUESTED RECORDS BACK TO: [phone]

## 2023-02-28 NOTE — ASSESSMENT & PLAN NOTE
Weight today 189 pounds with BMI of 30.6  Patient to continue with healthy diet, A1C is under 7 which is a good sign

## 2023-02-28 NOTE — PROGRESS NOTES
These are not suppose to be orders or an encounter I was doing chart preview and medication reconciliation.

## 2023-03-11 ENCOUNTER — HOSPITAL ENCOUNTER (OUTPATIENT)
Dept: LAB | Facility: MEDICAL CENTER | Age: 58
End: 2023-03-11
Attending: FAMILY MEDICINE
Payer: COMMERCIAL

## 2023-03-11 DIAGNOSIS — Z11.59 NEED FOR HEPATITIS C SCREENING TEST: ICD-10-CM

## 2023-03-11 DIAGNOSIS — E66.9 OBESITY (BMI 30-39.9): ICD-10-CM

## 2023-03-11 DIAGNOSIS — Z13.21 ENCOUNTER FOR VITAMIN DEFICIENCY SCREENING: ICD-10-CM

## 2023-03-11 DIAGNOSIS — Z12.5 PROSTATE CANCER SCREENING: ICD-10-CM

## 2023-03-11 DIAGNOSIS — I10 ESSENTIAL HYPERTENSION: ICD-10-CM

## 2023-03-11 DIAGNOSIS — E78.2 MIXED HYPERLIPIDEMIA: ICD-10-CM

## 2023-03-11 DIAGNOSIS — E11.69 TYPE 2 DIABETES MELLITUS WITH OTHER SPECIFIED COMPLICATION, WITHOUT LONG-TERM CURRENT USE OF INSULIN (HCC): ICD-10-CM

## 2023-03-11 LAB
25(OH)D3 SERPL-MCNC: 22 NG/ML (ref 30–100)
ALBUMIN SERPL BCP-MCNC: 4.2 G/DL (ref 3.2–4.9)
ALBUMIN/GLOB SERPL: 1.4 G/DL
ALP SERPL-CCNC: 96 U/L (ref 30–99)
ALT SERPL-CCNC: 21 U/L (ref 2–50)
ANION GAP SERPL CALC-SCNC: 11 MMOL/L (ref 7–16)
AST SERPL-CCNC: 18 U/L (ref 12–45)
BILIRUB SERPL-MCNC: 0.3 MG/DL (ref 0.1–1.5)
BUN SERPL-MCNC: 18 MG/DL (ref 8–22)
CALCIUM ALBUM COR SERPL-MCNC: 8.9 MG/DL (ref 8.5–10.5)
CALCIUM SERPL-MCNC: 9.1 MG/DL (ref 8.5–10.5)
CHLORIDE SERPL-SCNC: 103 MMOL/L (ref 96–112)
CHOLEST SERPL-MCNC: 238 MG/DL (ref 100–199)
CO2 SERPL-SCNC: 21 MMOL/L (ref 20–33)
CREAT SERPL-MCNC: 0.95 MG/DL (ref 0.5–1.4)
CREAT UR-MCNC: 132.96 MG/DL
ERYTHROCYTE [DISTWIDTH] IN BLOOD BY AUTOMATED COUNT: 50.2 FL (ref 35.9–50)
GFR SERPLBLD CREATININE-BSD FMLA CKD-EPI: 93 ML/MIN/1.73 M 2
GLOBULIN SER CALC-MCNC: 3.1 G/DL (ref 1.9–3.5)
GLUCOSE SERPL-MCNC: 106 MG/DL (ref 65–99)
HCT VFR BLD AUTO: 54.8 % (ref 42–52)
HCV AB SER QL: NORMAL
HDLC SERPL-MCNC: 30 MG/DL
HGB BLD-MCNC: 18.5 G/DL (ref 14–18)
LDLC SERPL CALC-MCNC: ABNORMAL MG/DL
MCH RBC QN AUTO: 31.3 PG (ref 27–33)
MCHC RBC AUTO-ENTMCNC: 33.8 G/DL (ref 33.7–35.3)
MCV RBC AUTO: 92.7 FL (ref 81.4–97.8)
MICROALBUMIN UR-MCNC: <1.2 MG/DL
MICROALBUMIN/CREAT UR: NORMAL MG/G (ref 0–30)
PLATELET # BLD AUTO: 199 K/UL (ref 164–446)
PMV BLD AUTO: 10.4 FL (ref 9–12.9)
POTASSIUM SERPL-SCNC: 4.6 MMOL/L (ref 3.6–5.5)
PROT SERPL-MCNC: 7.3 G/DL (ref 6–8.2)
PSA SERPL-MCNC: 0.83 NG/ML (ref 0–4)
RBC # BLD AUTO: 5.91 M/UL (ref 4.7–6.1)
SODIUM SERPL-SCNC: 135 MMOL/L (ref 135–145)
TRIGL SERPL-MCNC: 603 MG/DL (ref 0–149)
TSH SERPL DL<=0.005 MIU/L-ACNC: 2.57 UIU/ML (ref 0.38–5.33)
VIT B12 SERPL-MCNC: 891 PG/ML (ref 211–911)
WBC # BLD AUTO: 7 K/UL (ref 4.8–10.8)

## 2023-03-11 PROCEDURE — 82607 VITAMIN B-12: CPT

## 2023-03-11 PROCEDURE — 36415 COLL VENOUS BLD VENIPUNCTURE: CPT

## 2023-03-11 PROCEDURE — 82306 VITAMIN D 25 HYDROXY: CPT

## 2023-03-11 PROCEDURE — 85027 COMPLETE CBC AUTOMATED: CPT

## 2023-03-11 PROCEDURE — 80053 COMPREHEN METABOLIC PANEL: CPT

## 2023-03-11 PROCEDURE — 80061 LIPID PANEL: CPT

## 2023-03-11 PROCEDURE — 84443 ASSAY THYROID STIM HORMONE: CPT

## 2023-03-11 PROCEDURE — 82570 ASSAY OF URINE CREATININE: CPT

## 2023-03-11 PROCEDURE — 84153 ASSAY OF PSA TOTAL: CPT

## 2023-03-11 PROCEDURE — 82043 UR ALBUMIN QUANTITATIVE: CPT

## 2023-03-11 PROCEDURE — G0472 HEP C SCREEN HIGH RISK/OTHER: HCPCS

## 2023-04-20 ENCOUNTER — APPOINTMENT (OUTPATIENT)
Dept: MEDICAL GROUP | Facility: MEDICAL CENTER | Age: 58
End: 2023-04-20
Payer: COMMERCIAL

## 2023-04-21 ENCOUNTER — OFFICE VISIT (OUTPATIENT)
Dept: MEDICAL GROUP | Facility: MEDICAL CENTER | Age: 58
End: 2023-04-21
Payer: COMMERCIAL

## 2023-04-21 VITALS
BODY MASS INDEX: 31.27 KG/M2 | WEIGHT: 194.6 LBS | OXYGEN SATURATION: 97 % | SYSTOLIC BLOOD PRESSURE: 130 MMHG | HEIGHT: 66 IN | TEMPERATURE: 97.2 F | DIASTOLIC BLOOD PRESSURE: 78 MMHG | HEART RATE: 79 BPM

## 2023-04-21 DIAGNOSIS — E11.69 TYPE 2 DIABETES MELLITUS WITH OTHER SPECIFIED COMPLICATION, WITHOUT LONG-TERM CURRENT USE OF INSULIN (HCC): Chronic | ICD-10-CM

## 2023-04-21 DIAGNOSIS — M25.512 ACUTE PAIN OF LEFT SHOULDER: ICD-10-CM

## 2023-04-21 DIAGNOSIS — E78.2 MIXED HYPERLIPIDEMIA: ICD-10-CM

## 2023-04-21 DIAGNOSIS — E55.9 VITAMIN D DEFICIENCY: ICD-10-CM

## 2023-04-21 DIAGNOSIS — Z23 NEED FOR VACCINATION: ICD-10-CM

## 2023-04-21 DIAGNOSIS — I10 ESSENTIAL HYPERTENSION: ICD-10-CM

## 2023-04-21 DIAGNOSIS — R06.83 SNORING: ICD-10-CM

## 2023-04-21 PROCEDURE — 90471 IMMUNIZATION ADMIN: CPT | Performed by: FAMILY MEDICINE

## 2023-04-21 PROCEDURE — 99214 OFFICE O/P EST MOD 30 MIN: CPT | Mod: 25 | Performed by: FAMILY MEDICINE

## 2023-04-21 PROCEDURE — 90677 PCV20 VACCINE IM: CPT | Performed by: FAMILY MEDICINE

## 2023-04-21 RX ORDER — ATORVASTATIN CALCIUM 20 MG/1
20 TABLET, FILM COATED ORAL NIGHTLY
Qty: 90 TABLET | Refills: 3 | Status: SHIPPED | OUTPATIENT
Start: 2023-04-21

## 2023-04-21 ASSESSMENT — FIBROSIS 4 INDEX: FIB4 SCORE: 1.13

## 2023-04-21 NOTE — PROGRESS NOTES
"Subjective:     CC: \"lab follow up and shoulder pain\"    HPI:   Raymond presents today with:   [id number]    Feeling better overall  Does get short of breath with exertion at times like when he goes up stairs quickly  No chest pain with this    Left shoulder pain  Will get stabbing type pain in lateral left arm and posterior shoulder  Turning the steering wheel seems to bring the pain on  Does not hurt when walking up stairs or other exertion      Problem   Acute Pain of Left Shoulder   Vitamin D Deficiency       Current Outpatient Medications Ordered in Epic   Medication Sig Dispense Refill    atorvastatin (LIPITOR) 20 MG Tab Take 1 Tablet by mouth every evening. 90 Tablet 3     No current Epic-ordered facility-administered medications on file.       Health Maintenance: PCV-20 today, reports colonoscopy is scheduled    ROS:  ROS see HPI    Objective:     Exam:  /78   Pulse 79   Temp 36.2 °C (97.2 °F) (Temporal)   Ht 1.676 m (5' 6\")   Wt 88.3 kg (194 lb 9.6 oz)   SpO2 97%   BMI 31.41 kg/m²  Body mass index is 31.41 kg/m².    Physical Exam  Vitals reviewed.   Constitutional:       General: He is not in acute distress.     Appearance: Normal appearance.   HENT:      Head: Normocephalic and atraumatic.   Cardiovascular:      Rate and Rhythm: Normal rate and regular rhythm.      Heart sounds: Normal heart sounds.   Pulmonary:      Effort: Pulmonary effort is normal. No respiratory distress.      Breath sounds: Normal breath sounds.   Musculoskeletal:         General: No swelling, tenderness or deformity. Normal range of motion.   Skin:     General: Skin is warm and dry.   Neurological:      Mental Status: He is alert. Mental status is at baseline.   Psychiatric:         Mood and Affect: Mood normal.         Behavior: Behavior normal.     Labs:            Assessment & Plan:     57 y.o. male with the following -     1. Acute pain of left shoulder  Patient would like to start with home " exercises, also recommended ice and heat therapy. If not improving patient to get X-ray and come in for further evaluation.  - DX-SHOULDER 2+ LEFT; Future    2. Mixed hyperlipidemia  Elevated total cholesterol and triglycerides. Discussed increased risk of heart attack, stroke and pancreatitis. Will initiate statin therapy with recheck in 6 months.  - atorvastatin (LIPITOR) 20 MG Tab; Take 1 Tablet by mouth every evening.  Dispense: 90 Tablet; Refill: 3    3. Snoring  On problem list previously, patient reports not having difficulty with this, discussed with elevation in hematocrit this may be a result of sleep apnea. Will continue to discuss with patient at future visit.    4. Type 2 diabetes mellitus with other specified complication, without long-term current use of insulin (HCC)  Chronic and stable diet controlled will continue to monitor at least annually but more likely every 6 months    5. Essential hypertension  This is a chronic condition, controlled.  Blood pressure is at goal under 140/90 in the office today.  We will continue the current regimen.  - no medication at this time    6. Vitamin D deficiency  Vitamin D3 take 5,000 units daily for 2 months for replacement and then take Vitamin D3 2,000 units daily for maintenance    7. Need for vaccination  - Pneumococcal Conjugate Vaccine 20-Valent (19 yrs+)       Return in about 6 months (around 10/21/2023), or if symptoms worsen or fail to improve, for Medication F/U, Lab F/U.    Please note that this dictation was created using voice recognition software. I have made every reasonable attempt to correct obvious errors, but I expect that there are errors of grammar and possibly content that I did not discover before finalizing the note.

## 2023-04-21 NOTE — PATIENT INSTRUCTIONS
Vitamin D3 take 5,000 units daily for 2 months for replacement and then take Vitamin D3 2,000 units daily for maintenance

## 2023-09-25 DIAGNOSIS — E78.2 MIXED HYPERLIPIDEMIA: ICD-10-CM

## 2023-09-25 DIAGNOSIS — E11.69 TYPE 2 DIABETES MELLITUS WITH OTHER SPECIFIED COMPLICATION, WITHOUT LONG-TERM CURRENT USE OF INSULIN (HCC): Chronic | ICD-10-CM

## 2023-09-25 NOTE — LETTER
September 25, 2023        Raymond Ballesteros De La Cruz      Greetings, I hope you are doing well. I have attached orders for blood work. Please do these at your earliest convenience. Please have an appointment to see me afterwards so we can go over the results.      Saludos, espero que estés quentin. Adjunto órdenes para análisis de lyric. Asael esto lo antes posible. Por favor, programe dick jim para verme después para que podamos repasar los resultados.        Regards,          Danny Witt D.O.

## 2023-10-09 ENCOUNTER — HOSPITAL ENCOUNTER (OUTPATIENT)
Dept: LAB | Facility: MEDICAL CENTER | Age: 58
End: 2023-10-09
Attending: FAMILY MEDICINE
Payer: COMMERCIAL

## 2023-10-09 DIAGNOSIS — E11.69 TYPE 2 DIABETES MELLITUS WITH OTHER SPECIFIED COMPLICATION, WITHOUT LONG-TERM CURRENT USE OF INSULIN (HCC): Chronic | ICD-10-CM

## 2023-10-09 DIAGNOSIS — E78.2 MIXED HYPERLIPIDEMIA: ICD-10-CM

## 2023-10-09 LAB
ALBUMIN SERPL BCP-MCNC: 4.4 G/DL (ref 3.2–4.9)
ALBUMIN/GLOB SERPL: 1.8 G/DL
ALP SERPL-CCNC: 83 U/L (ref 30–99)
ALT SERPL-CCNC: 26 U/L (ref 2–50)
ANION GAP SERPL CALC-SCNC: 9 MMOL/L (ref 7–16)
AST SERPL-CCNC: 17 U/L (ref 12–45)
BILIRUB SERPL-MCNC: 0.6 MG/DL (ref 0.1–1.5)
BUN SERPL-MCNC: 22 MG/DL (ref 8–22)
CALCIUM ALBUM COR SERPL-MCNC: 8.8 MG/DL (ref 8.5–10.5)
CALCIUM SERPL-MCNC: 9.1 MG/DL (ref 8.5–10.5)
CHLORIDE SERPL-SCNC: 106 MMOL/L (ref 96–112)
CHOLEST SERPL-MCNC: 161 MG/DL (ref 100–199)
CO2 SERPL-SCNC: 26 MMOL/L (ref 20–33)
CREAT SERPL-MCNC: 1.07 MG/DL (ref 0.5–1.4)
EST. AVERAGE GLUCOSE BLD GHB EST-MCNC: 134 MG/DL
FASTING STATUS PATIENT QL REPORTED: NORMAL
GFR SERPLBLD CREATININE-BSD FMLA CKD-EPI: 80 ML/MIN/1.73 M 2
GLOBULIN SER CALC-MCNC: 2.5 G/DL (ref 1.9–3.5)
GLUCOSE SERPL-MCNC: 115 MG/DL (ref 65–99)
HBA1C MFR BLD: 6.3 % (ref 4–5.6)
HDLC SERPL-MCNC: 31 MG/DL
LDLC SERPL CALC-MCNC: 66 MG/DL
POTASSIUM SERPL-SCNC: 4.1 MMOL/L (ref 3.6–5.5)
PROT SERPL-MCNC: 6.9 G/DL (ref 6–8.2)
SODIUM SERPL-SCNC: 141 MMOL/L (ref 135–145)
TRIGL SERPL-MCNC: 320 MG/DL (ref 0–149)

## 2023-10-09 PROCEDURE — 36415 COLL VENOUS BLD VENIPUNCTURE: CPT

## 2023-10-09 PROCEDURE — 83036 HEMOGLOBIN GLYCOSYLATED A1C: CPT

## 2023-10-09 PROCEDURE — 80061 LIPID PANEL: CPT

## 2023-10-09 PROCEDURE — 80053 COMPREHEN METABOLIC PANEL: CPT

## 2023-10-17 ENCOUNTER — APPOINTMENT (OUTPATIENT)
Dept: MEDICAL GROUP | Facility: MEDICAL CENTER | Age: 58
End: 2023-10-17
Payer: COMMERCIAL

## 2023-10-17 NOTE — PROGRESS NOTES
Subjective:     CC: Review results    HPI:   Raymond presents today with    Patient of   Presents today to review recent lab 10/9/2023  Labs shows: CMP shows IFG, creatinine 1.07, EGFR 80, LFT not elevated.  Hypertriglyceridemia, HDL less than 50 to see female) LDL less than 70, A1c 6.3  Patient is on atorvastatin 20 mg    Seen by Dr. Witt 4/21/2023 for #acute left shoulder pain, hyperlipidemia, snoring, type 2 diabetes controlled with diet, hypertension, vitamin D deficiency.    # T2DM without microalbuminuria controlled by lifestyle  # HTN  # HLD      No problems updated.    Health Maintenance: {COMPLETED:694206}    ROS:  ROS    Objective:     Exam:  There were no vitals taken for this visit. There is no height or weight on file to calculate BMI.    Physical Exam    {A chaperone was offered to the patient during today's exam.:[id number]}    Labs: ***    Assessment & Plan:     58 y.o. male with the following -     ***        Referral for genetic research was offered. Patient {declined/accepted}.    I spent a total of *** minutes with record review, exam, communication with the patient, communication with other providers, and documentation of this encounter.      No follow-ups on file.    Please note that this dictation was created using voice recognition software. I have made every reasonable attempt to correct obvious errors, but I expect that there are errors of grammar and possibly content that I did not discover before finalizing the note.

## 2023-10-19 ENCOUNTER — OFFICE VISIT (OUTPATIENT)
Dept: MEDICAL GROUP | Facility: MEDICAL CENTER | Age: 58
End: 2023-10-19
Payer: COMMERCIAL

## 2023-10-19 VITALS
SYSTOLIC BLOOD PRESSURE: 122 MMHG | BODY MASS INDEX: 30.22 KG/M2 | HEART RATE: 74 BPM | DIASTOLIC BLOOD PRESSURE: 74 MMHG | OXYGEN SATURATION: 96 % | HEIGHT: 66 IN | TEMPERATURE: 97.6 F | WEIGHT: 188 LBS

## 2023-10-19 DIAGNOSIS — R73.03 PREDIABETES: ICD-10-CM

## 2023-10-19 DIAGNOSIS — E78.2 MIXED HYPERLIPIDEMIA: ICD-10-CM

## 2023-10-19 DIAGNOSIS — R09.81 NASAL CONGESTION: ICD-10-CM

## 2023-10-19 DIAGNOSIS — J02.9 SORE THROAT: ICD-10-CM

## 2023-10-19 PROCEDURE — 3074F SYST BP LT 130 MM HG: CPT

## 2023-10-19 PROCEDURE — 99213 OFFICE O/P EST LOW 20 MIN: CPT

## 2023-10-19 PROCEDURE — 3078F DIAST BP <80 MM HG: CPT

## 2023-10-19 RX ORDER — METRONIDAZOLE 250 MG/1
TABLET ORAL
COMMUNITY
Start: 2023-08-30 | End: 2023-10-19

## 2023-10-19 RX ORDER — ALBUTEROL SULFATE 90 UG/1
AEROSOL, METERED RESPIRATORY (INHALATION)
COMMUNITY
End: 2023-10-19

## 2023-10-19 RX ORDER — OMEPRAZOLE 20 MG/1
CAPSULE, DELAYED RELEASE ORAL
COMMUNITY
Start: 2023-08-30 | End: 2023-10-19

## 2023-10-19 RX ORDER — BISMUTH SUBSALICYLATE 262 MG
TABLET ORAL
COMMUNITY
Start: 2023-08-30 | End: 2023-10-19

## 2023-10-19 RX ORDER — GUAIFENESIN 600 MG/1
TABLET, EXTENDED RELEASE ORAL
COMMUNITY
End: 2023-10-19

## 2023-10-19 RX ORDER — DOXYCYCLINE HYCLATE 100 MG/1
CAPSULE ORAL
COMMUNITY
Start: 2023-08-30 | End: 2023-10-19

## 2023-10-19 RX ORDER — GEMFIBROZIL 600 MG/1
TABLET, FILM COATED ORAL
COMMUNITY
End: 2023-10-19

## 2023-10-19 ASSESSMENT — FIBROSIS 4 INDEX: FIB4 SCORE: 0.97

## 2023-10-19 NOTE — PROGRESS NOTES
"Chief Complaint   Patient presents with    Pharyngitis     X 2 days        HPI: This is a 58 y.o. patient has 2 days of sore throat, runny nose. No cough and congestion. No ear fullness. No abnormal shortness of breath. No nausea vomiting or diarrhea. Mild subjective fever and chills. No sick exposures. No coughing up blood. No headache.  Patient has not taken over-the-counter analgesics and decongestant.     ROS:  No fever, cough, nausea, changes in bowel movements or skin rash.      I reviewed the patient's medications, allergies and medical history:  Current Outpatient Medications   Medication Sig Dispense Refill    atorvastatin (LIPITOR) 20 MG Tab Take 1 Tablet by mouth every evening. 90 Tablet 3     No current facility-administered medications for this visit.     Patient has no known allergies.  Past Medical History:   Diagnosis Date    Tubular adenoma of colon 9/2016        EXAM:  /74   Pulse 74   Temp 36.4 °C (97.6 °F) (Temporal)   Ht 1.676 m (5' 6\")   Wt 85.3 kg (188 lb)   SpO2 96%   General: NAD, non-toxic appearance.  Eyes: PERRL, conjunctiva slightly injected, no photophobia or eye discharge.  Ears: Normal pinnae. TM's pearly gray with good landmarks bilaterally.  Nares: Patent with thin, clear mucus.  Sinuses: Non-tender over maxillary and frontal sinuses.  Throat: Erythematous injection with slight enlarged tonsils. No exudates.   Neck: Supple, with shotty anterior cervical lymphadenopathy.  Lungs: Good air entry bilaterally, clear to auscultation. No wheeze, rhonchi or crackles. Normal respiratory effort.  Heart: Regular rate without murmur.  Abdomen: Soft and non-tender. No hepatosplenomegaly.  Skin: Warm and dry. No rash.     Results for orders placed or performed during the hospital encounter of 10/09/23   HEMOGLOBIN A1C   Result Value Ref Range    Glycohemoglobin 6.3 (H) 4.0 - 5.6 %    Est Avg Glucose 134 mg/dL   Lipid Profile   Result Value Ref Range    Cholesterol,Tot 161 100 - 199 " mg/dL    Triglycerides 320 (H) 0 - 149 mg/dL    HDL 31 (A) >=40 mg/dL    LDL 66 <100 mg/dL   Comp Metabolic Panel   Result Value Ref Range    Sodium 141 135 - 145 mmol/L    Potassium 4.1 3.6 - 5.5 mmol/L    Chloride 106 96 - 112 mmol/L    Co2 26 20 - 33 mmol/L    Anion Gap 9.0 7.0 - 16.0    Glucose 115 (H) 65 - 99 mg/dL    Bun 22 8 - 22 mg/dL    Creatinine 1.07 0.50 - 1.40 mg/dL    Calcium 9.1 8.5 - 10.5 mg/dL    Correct Calcium 8.8 8.5 - 10.5 mg/dL    AST(SGOT) 17 12 - 45 U/L    ALT(SGPT) 26 2 - 50 U/L    Alkaline Phosphatase 83 30 - 99 U/L    Total Bilirubin 0.6 0.1 - 1.5 mg/dL    Albumin 4.4 3.2 - 4.9 g/dL    Total Protein 6.9 6.0 - 8.2 g/dL    Globulin 2.5 1.9 - 3.5 g/dL    A-G Ratio 1.8 g/dL   FASTING STATUS   Result Value Ref Range    Fasting Status Non-Fasting    ESTIMATED GFR   Result Value Ref Range    GFR (CKD-EPI) 80 >60 mL/min/1.73 m 2         ASSESSMENT:   1. Sore throat    2. Nasal congestion    3. Mixed hyperlipidemia          PLAN:  1. Discussed benign nature of viral upper respiratory infections.  2. OTC anti-pyretics and decongestants as needed. Supportive care advised.  3. Follow-up in office or urgent care for worsening symptoms, difficulty breathing, lack of expected recovery, or should new symptoms or problems arise.  4. Work on diet to help control cholesterol, and blood sugar. A1C was 6.3%, discussed that he is borderline diabetic. No recorded A1C over 6.5% per patient.

## 2024-03-01 ENCOUNTER — OFFICE VISIT (OUTPATIENT)
Dept: MEDICAL GROUP | Facility: MEDICAL CENTER | Age: 59
End: 2024-03-01
Payer: COMMERCIAL

## 2024-03-01 VITALS
SYSTOLIC BLOOD PRESSURE: 124 MMHG | HEIGHT: 66 IN | HEART RATE: 68 BPM | WEIGHT: 190 LBS | TEMPERATURE: 97.6 F | BODY MASS INDEX: 30.53 KG/M2 | RESPIRATION RATE: 16 BRPM | DIASTOLIC BLOOD PRESSURE: 70 MMHG | OXYGEN SATURATION: 94 %

## 2024-03-01 DIAGNOSIS — J30.9 ALLERGIC RHINITIS, UNSPECIFIED SEASONALITY, UNSPECIFIED TRIGGER: ICD-10-CM

## 2024-03-01 DIAGNOSIS — R05.9 COUGH, UNSPECIFIED TYPE: ICD-10-CM

## 2024-03-01 PROCEDURE — 3078F DIAST BP <80 MM HG: CPT | Performed by: NURSE PRACTITIONER

## 2024-03-01 PROCEDURE — 99213 OFFICE O/P EST LOW 20 MIN: CPT | Performed by: NURSE PRACTITIONER

## 2024-03-01 PROCEDURE — 3074F SYST BP LT 130 MM HG: CPT | Performed by: NURSE PRACTITIONER

## 2024-03-01 RX ORDER — PREDNISONE 20 MG/1
40 TABLET ORAL DAILY
Qty: 10 TABLET | Refills: 0 | Status: SHIPPED | OUTPATIENT
Start: 2024-03-01 | End: 2024-03-06

## 2024-03-01 RX ORDER — FLUTICASONE PROPIONATE 50 MCG
1 SPRAY, SUSPENSION (ML) NASAL
Qty: 16 G | Refills: 3 | Status: SHIPPED | OUTPATIENT
Start: 2024-03-01

## 2024-03-01 RX ORDER — AZELASTINE 1 MG/ML
2 SPRAY, METERED NASAL 2 TIMES DAILY
Qty: 30 ML | Refills: 0 | Status: SHIPPED | OUTPATIENT
Start: 2024-03-01 | End: 2024-03-25

## 2024-03-01 ASSESSMENT — FIBROSIS 4 INDEX: FIB4 SCORE: 0.97

## 2024-03-01 ASSESSMENT — PATIENT HEALTH QUESTIONNAIRE - PHQ9: CLINICAL INTERPRETATION OF PHQ2 SCORE: 0

## 2024-03-01 NOTE — PROGRESS NOTES
"Subjective:     HPI:     Raymond De La Cruz is a 58 y.o. male  presents to discuss:   Chief Complaint   Patient presents with    Sinus Problem     Cough x 5 months      Patient of Dr. Witt.     Presents with about 5 months of having runny nose, slight congestion and tight cough.  Has tried a few over-the-counter products without any significant improvement.  Denies history of asthma.  No fevers.      No problems updated.     ROS: : see above      Current Outpatient Medications:     predniSONE (DELTASONE) 20 MG Tab, Take 2 Tablets by mouth every day for 5 days. Take in the morning., Disp: 10 Tablet, Rfl: 0    azelastine (ASTELIN) 137 MCG/SPRAY nasal spray, Administer 2 Sprays into affected nostril(S) 2 times a day. For 4 weeks then stop to see if symptoms improve, Disp: 30 mL, Rfl: 0    fluticasone (FLONASE) 50 MCG/ACT nasal spray, Administer 1 Spray into affected nostril(S) at bedtime. For 6-8 weeks then stop to see if symptoms have improved., Disp: 16 g, Rfl: 3    atorvastatin (LIPITOR) 20 MG Tab, Take 1 Tablet by mouth every evening., Disp: 90 Tablet, Rfl: 3    No Known Allergies    Objective:     Vitals: /70   Pulse 68   Temp 36.4 °C (97.6 °F)   Resp 16   Ht 1.676 m (5' 6\")   Wt 86.2 kg (190 lb)   SpO2 94%   BMI 30.67 kg/m²    General: Alert, pleasant, NAD  HEENT: Normocephalic.  Neck supple.  Moderate hypertrophic turbinates.  Positive for rhinorrhea.  Bilateral EAC free of debris, nonbulging, nonerythematous TM.  Crowded airway.  No exudate noted.  Respiratory: Dry tight cough, no wheezing, no crackles.  Airflow heard throughout.  No distress, no audible wheezing, RR -WNL  Skin: Warm, dry, no rashes.  Extremities: No leg edema. No discoloration  Neurological: No tremors  Psych:  Affect/mood is normal, judgement is good, memory is intact, grooming is appropriate.    Assessment/Plan:      1. Allergic rhinitis, unspecified seasonality, unspecified trigger  Not well controlled at this time. "   Recommend trial of Astelin x 4 weeks and Flonase x 6-8 weeks, gargle with salt water. Follow up if symptoms persist.   - predniSONE (DELTASONE) 20 MG Tab; Take 2 Tablets by mouth every day for 5 days. Take in the morning.  Dispense: 10 Tablet; Refill: 0  - azelastine (ASTELIN) 137 MCG/SPRAY nasal spray; Administer 2 Sprays into affected nostril(S) 2 times a day. For 4 weeks then stop to see if symptoms improve  Dispense: 30 mL; Refill: 0  - fluticasone (FLONASE) 50 MCG/ACT nasal spray; Administer 1 Spray into affected nostril(S) at bedtime. For 6-8 weeks then stop to see if symptoms have improved.  Dispense: 16 g; Refill: 3    2. Cough, unspecified type  Ongoing for 5-6 months in the setting of rhinitis. Denies hx of asthma/COPD. No fevers, no hemoptysis. No increased GUILLEN.   Lungs sounds are clear. Non productive.   Suspect reactive-recommend given the length of his symptoms a short course of steroids to see if this could help with reducing inflammation.  Could consider PFT's if persistent.   - predniSONE (DELTASONE) 20 MG Tab; Take 2 Tablets by mouth every day for 5 days. Take in the morning.  Dispense: 10 Tablet; Refill: 0  - azelastine (ASTELIN) 137 MCG/SPRAY nasal spray; Administer 2 Sprays into affected nostril(S) 2 times a day. For 4 weeks then stop to see if symptoms improve  Dispense: 30 mL; Refill: 0  - fluticasone (FLONASE) 50 MCG/ACT nasal spray; Administer 1 Spray into affected nostril(S) at bedtime. For 6-8 weeks then stop to see if symptoms have improved.  Dispense: 16 g; Refill: 3        Return if symptoms worsen or fail to improve.        Amber SERNA

## 2024-03-24 DIAGNOSIS — J30.9 ALLERGIC RHINITIS, UNSPECIFIED SEASONALITY, UNSPECIFIED TRIGGER: ICD-10-CM

## 2024-03-24 DIAGNOSIS — R05.9 COUGH, UNSPECIFIED TYPE: ICD-10-CM

## 2024-03-25 RX ORDER — AZELASTINE HYDROCHLORIDE 137 UG/1
SPRAY, METERED NASAL
Qty: 90 ML | Refills: 1 | Status: SHIPPED | OUTPATIENT
Start: 2024-03-25

## 2025-03-17 ENCOUNTER — APPOINTMENT (OUTPATIENT)
Dept: MEDICAL GROUP | Facility: MEDICAL CENTER | Age: 60
End: 2025-03-17
Payer: COMMERCIAL

## 2025-03-18 ENCOUNTER — OFFICE VISIT (OUTPATIENT)
Dept: MEDICAL GROUP | Facility: MEDICAL CENTER | Age: 60
End: 2025-03-18
Payer: COMMERCIAL

## 2025-03-18 VITALS
TEMPERATURE: 97 F | WEIGHT: 192.46 LBS | BODY MASS INDEX: 30.93 KG/M2 | OXYGEN SATURATION: 97 % | DIASTOLIC BLOOD PRESSURE: 80 MMHG | SYSTOLIC BLOOD PRESSURE: 118 MMHG | HEART RATE: 68 BPM | HEIGHT: 66 IN

## 2025-03-18 DIAGNOSIS — R14.0 BLOATING: ICD-10-CM

## 2025-03-18 DIAGNOSIS — Z11.3 ROUTINE SCREENING FOR STI (SEXUALLY TRANSMITTED INFECTION): ICD-10-CM

## 2025-03-18 DIAGNOSIS — I10 ESSENTIAL HYPERTENSION: ICD-10-CM

## 2025-03-18 DIAGNOSIS — R10.13 DYSPEPSIA: ICD-10-CM

## 2025-03-18 DIAGNOSIS — E78.2 MIXED HYPERLIPIDEMIA: ICD-10-CM

## 2025-03-18 DIAGNOSIS — J31.0 RHINITIS, UNSPECIFIED TYPE: ICD-10-CM

## 2025-03-18 DIAGNOSIS — E11.69 TYPE 2 DIABETES MELLITUS WITH OTHER SPECIFIED COMPLICATION, WITHOUT LONG-TERM CURRENT USE OF INSULIN (HCC): Chronic | ICD-10-CM

## 2025-03-18 DIAGNOSIS — M79.18 GLUTEAL PAIN: ICD-10-CM

## 2025-03-18 PROCEDURE — 3079F DIAST BP 80-89 MM HG: CPT | Performed by: STUDENT IN AN ORGANIZED HEALTH CARE EDUCATION/TRAINING PROGRAM

## 2025-03-18 PROCEDURE — 3074F SYST BP LT 130 MM HG: CPT | Performed by: STUDENT IN AN ORGANIZED HEALTH CARE EDUCATION/TRAINING PROGRAM

## 2025-03-18 PROCEDURE — 99214 OFFICE O/P EST MOD 30 MIN: CPT | Performed by: STUDENT IN AN ORGANIZED HEALTH CARE EDUCATION/TRAINING PROGRAM

## 2025-03-18 RX ORDER — LISINOPRIL 10 MG/1
TABLET ORAL
COMMUNITY
End: 2025-03-18

## 2025-03-18 RX ORDER — FLUTICASONE PROPIONATE 50 MCG
1 SPRAY, SUSPENSION (ML) NASAL DAILY
Qty: 16 G | Refills: 11 | Status: SHIPPED | OUTPATIENT
Start: 2025-03-18

## 2025-03-18 RX ORDER — CETIRIZINE HYDROCHLORIDE 10 MG/1
10 TABLET ORAL DAILY
Qty: 90 TABLET | Refills: 3 | Status: SHIPPED | OUTPATIENT
Start: 2025-03-18

## 2025-03-18 RX ORDER — GEMFIBROZIL 600 MG/1
TABLET, FILM COATED ORAL
COMMUNITY
End: 2025-03-18

## 2025-03-18 ASSESSMENT — PATIENT HEALTH QUESTIONNAIRE - PHQ9: CLINICAL INTERPRETATION OF PHQ2 SCORE: 0

## 2025-03-18 NOTE — PROGRESS NOTES
Subjective:     CC: multiple complaint    HPI:   Raymond presents today with    PMH HTN, HLD, NIDDM2 lifestyle   Last seen 3/1/24    Patient presents for acute visit with multiple concerns  Report he stopped all medications because he does not think it works.    # bloatednes x 4 month  # gas  He has some bloatedness when he eats chicken  - he has bloatedness with all food but worst with chicken   - no associated pain  - has not tried otc   - colonoscopy 2023, 7 year recall   - EGD 2023 wnl  he does not recall his symptom in 2023  Rhinitis in the morning    patient's complaint of pain while sitting down.  He reports he feels he is constipated because he is having bowel movement once per day.  He denies any pain or blood while having a bowel movement.  She report after sitting down for 1 to 2 hours he can sometimes experience pain around his anus.  On examination patient point to his bilateral buttocks since that his pain is not around the anus.    Verbal consent was acquired by the patient to use [facility] ambient listening note generation during this visit Yes   History of Present Illness  The patient presents for evaluation of bloating, constipation, and rectal pain. He is accompanied by a .    He reports experiencing bloating, particularly after consuming chicken, which he describes as an uncomfortable sensation rather than pain. He has not sought any pharmacological interventions for this issue. He also notes that he feels satiated earlier than usual during meals. He has undergone colonoscopies 2 to 3 times, the most recent being approximately 2 to 3 years ago, and reports no presence of blood in his stools. He recalls experiencing some pain during his last EGD, but subsequent communication from the clinic indicated normal findings and a recommendation for a repeat procedure in 7 years. He reports no history of gastrointestinal sensitivity, with these symptoms having started around 4 months ago. He  "also reports excessive gas production.    He experiences numbness in his buttocks after sitting for 1 to 2 hours, which he perceives as abnormal. He reports no history of hemorrhoids or bleeding. He believes he should have 3 bowel movements per day, but currently experiences infrequent bowel movements without diarrhea. He reports mild constipation and a burning sensation in his rectum after prolonged sitting, leading him to suspect the presence of a hive. He consulted a gastroenterologist for rectal pain last year. He typically has one bowel movement per day, spending about 2 to 3 minutes in the bathroom, and reports no palpable abnormalities around his anus. He experiences pain only after sitting for more than an hour, a symptom that has persisted for 3 years. He reports no pain during bowel movements or constant anal pain, and does not feel significant pressure. He reports mild gluteal pain, which is not severe enough to impede his ability to stand or walk.    Upon awakening, he often experiences rhinorrhea, characterized by clear, watery mucus. He has discontinued all his medications due to perceived ineffectiveness and persistent morning phlegm and mucus production.    MEDICATIONS  Discontinued: Janumet        Health Maintenance:   ROS:  ROS    Objective:     Exam:  /80 (BP Location: Left arm, Patient Position: Sitting, BP Cuff Size: Adult)   Pulse 68   Temp 36.1 °C (97 °F) (Temporal)   Ht 1.676 m (5' 6\")   Wt 87.3 kg (192 lb 7.4 oz)   SpO2 97%   BMI 31.06 kg/m²  Body mass index is 31.06 kg/m².    Physical Exam  Constitutional:       Appearance: Normal appearance.   Cardiovascular:      Rate and Rhythm: Normal rate and regular rhythm.   Pulmonary:      Effort: Pulmonary effort is normal.      Breath sounds: Normal breath sounds.   Musculoskeletal:      Cervical back: Normal range of motion and neck supple.   Lymphadenopathy:      Cervical: No cervical adenopathy.   Neurological:      Mental Status: " He is alert.         Labs:     Assessment & Plan:     59 y.o. male with the following -     1. Type 2 diabetes mellitus with other specified complication, without long-term current use of insulin (HCC)  Chronic, stable  Typically lifestyle controlled  Recommend patient obtain lab and follow-up with primary care  Signs symptoms of hyperglycemia  - CBC WITHOUT DIFFERENTIAL; Future  - Lipid Profile; Future  - TSH WITH REFLEX TO FT4; Future  - HEMOGLOBIN A1C; Future  - Comp Metabolic Panel; Future  - MICROALBUMIN CREAT RATIO URINE; Future    2. Essential hypertension  Blood pressure well-controlled while off lisinopril  Patient will check blood work to assess for microalbuminuria  - CBC WITHOUT DIFFERENTIAL; Future  - Lipid Profile; Future  - TSH WITH REFLEX TO FT4; Future  - HEMOGLOBIN A1C; Future  - Comp Metabolic Panel; Future  - MICROALBUMIN CREAT RATIO URINE; Future    3. Mixed hyperlipidemia  Chronic, stable no longer taking medication  Recommend patient obtain labs    4. Bloating  5. Dyspepsia  Nonspecific bloating ongoing for 4 months has I tried OTC medications.  Denies red flags  May consider addition of PPI  - H. PYLORI BREATH TEST    6. Routine screening for STI (sexually transmitted infection)    - HIV AG/AB COMBO ASSAY SCREENING; Future    7. Rhinitis, unspecified type  Chronic rhinitis typically in the morning report has stopped all medication because he feels like it does not work  - cetirizine (ZYRTEC) 10 MG Tab; Take 1 Tablet by mouth every day.  Dispense: 90 Tablet; Refill: 3  - fluticasone (FLONASE) 50 MCG/ACT nasal spray; Administer 1 Spray into affected nostril(S) every day.  Dispense: 16 g; Refill: 11    8.gluteal pain  Patient report history of pain around the anus while sitting however upon examination patient report the pain is actually in bilateral buttocks.  He is up-to-date with colonoscopy last colonoscopy was 2023.  He denies any bleeding, any pain with bowel movements.  - At this time  would recommend conservative management, piriformis stretch, lower back stretches, hamstring stretches were provided for patient's     Recommend patient follow-up with primary care in 6 weeks     Return in about 6 weeks (around 4/29/2025) for Lab review, Med check.    Please note that this dictation was created using voice recognition software. I have made every reasonable attempt to correct obvious errors, but I expect that there are errors of grammar and possibly content that I did not discover before finalizing the note.

## 2025-08-28 ENCOUNTER — APPOINTMENT (OUTPATIENT)
Dept: MEDICAL GROUP | Facility: MEDICAL CENTER | Age: 60
End: 2025-08-28
Payer: COMMERCIAL

## 2025-11-06 ENCOUNTER — APPOINTMENT (OUTPATIENT)
Dept: MEDICAL GROUP | Facility: MEDICAL CENTER | Age: 60
End: 2025-11-06
Payer: COMMERCIAL